# Patient Record
Sex: MALE | Race: BLACK OR AFRICAN AMERICAN | URBAN - METROPOLITAN AREA
[De-identification: names, ages, dates, MRNs, and addresses within clinical notes are randomized per-mention and may not be internally consistent; named-entity substitution may affect disease eponyms.]

---
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
## 2021-02-23 NOTE — PROGRESS NOTES
"Male New Patient Note       Assessment & Plan   Patient is a very pleasant 21-year-old male who presents today to establish care.  Patient was previously seen at a facility in [place].  A release of information was completed today to allow us to access his records.  Patient was hospitalized within the past year for hallucinations and schizophrenia-like behaviors.  He has since been on olanzapine 20 mg at bedtime.    Patient also has a history of GERD.  He has previously been taking gastrically reflux medications.  He is given a prescription for famotidine today to have on hand if he were to develop any heartburn symptoms.    Patient is otherwise a very healthy 21-year-old male.    Gastroesophageal reflux disease without esophagitis  - famotidine (PEPCID) 20 MG tablet  Dispense: 90 tablet; Refill: 3    Hallucinations  Patient is given a referral to psychiatry for ongoing management of his mental health disorders.  It is unclear exactly which diagnosis he was given to be prescribed olanzapine to begin with.  Patient is not aware of what diagnosis he holds.  We are awaiting release of information records from his hospitalization and prior care to better understand his medical history.  In the meantime, as referrals are taking quite some time to get in, a referral was placed for psychiatry here.  In addition, refill of olanzapine is sent to the pharmacy here.  - OLANZapine (ZYPREXA) 20 MG tablet  Dispense: 90 tablet; Refill: 3  - MENTAL HEALTH REFERRAL  - Adult; Psychiatry; Psychiatry; [facility] [phone]; We will contact you to schedule the appointment or please call with any questions     BMI:   Estimated body mass index is 29.97 kg/m  as calculated from the following:    Height as of this encounter: 1.918 m (6' 3.5\").    Weight as of this encounter: 110.2 kg (243 lb).   Weight management plan: Discussed briefly today.  Patient is displeased with the weight gain that he has had, which she " "has had since starting olanzapine.  He continues to be as active as possible, going to the gym.    No follow-ups on file.    Meredith Gale MD  [facility] JON Ribeiro is a 21 year old who presents for the following health issues     HPI   No cough, gastric reflux history. Some central chest aching, improves with antacids.  Patient presents to [facility] care.  Has a history of GERD, also a mental health history for which he is prescribed olanzapine.  His exact diagnosis is unknown to him.  We are awaiting release of information.    Review of Systems   Review of Systems:  CONSTITUTIONAL: Positive for weight gain since starting olanzapine. NEGATIVE for fever, chills  INTEGUMENTARY/SKIN: NEGATIVE for worrisome rashes, moles or lesions  EYES: NEGATIVE for vision changes or irritation  ENT/MOUTH: NEGATIVE for ear, mouth and throat problems  RESP: NEGATIVE for significant cough or SOB  BREAST: NEGATIVE for masses, tenderness or discharge  CV: NEGATIVE for chest pain, palpitations or peripheral edema  GI: Positive for heartburn/chest discomfort related to GERD. NEGATIVE for nausea, abdominal pain, or change in bowel habits  : NEGATIVE for frequency, dysuria, or hematuria  MUSCULOSKELETAL: NEGATIVE for significant arthralgias or myalgia  NEURO: NEGATIVE for weakness, dizziness or paresthesias  ENDOCRINE: NEGATIVE for temperature intolerance, skin/hair changes  HEME/ALLERGY: NEGATIVE for bleeding problems  PSYCHIATRIC: NEGATIVE for changes in mood or affect  Sleep:   Do you snore most or the night (as reported by a family member)? No  Do you feel sleepy or extremely tired during most of the day? No      Objective    /79   Pulse 70   Temp 98  F (36.7  C) (Oral)   Resp 16   Ht 1.918 m (6' 3.5\")   Wt 110.2 kg (243 lb)   SpO2 99%   BMI 29.97 kg/m    Body mass index is 29.97 kg/m .  Physical Exam   Vitals were reviewed and were normal  GENERAL: healthy, alert and no " distress  EYES: Eyes grossly normal to inspection, extraocular movements - intact  HENT: ears normal to inspection.   NECK: no asymmetry  RESP: lungs clear to auscultation - no rales, no rhonchi, no wheezes  CV: regular rates and rhythm, normal S1 S2, no S3 or S4 and no murmur, no click or rub -  ABDOMEN: soft, no tenderness, no  hepatosplenomegaly, no masses, normal bowel sounds  MS: extremities- no gross deformities noted, no edema  SKIN: no suspicious lesions, no rashes  NEURO: strength and tone- normal, sensory exam- grossly normal, mentation- intact, speech- normal, reflexes- symmetric  PSYCH: Alert and oriented times 3; speech- coherent , normal rate and volume; able to articulate logical thoughts, affect- flat            Marital Status:Single  Who lives in your household? Mother, brothers    Has anyone hurt you physically, for example by pushing, hitting, slapping or kicking you or forcing you to have sex? Denies  Do you feel threatened or controlled by a partner, ex-partner or anyone in your life? Denies    Sexual Health     Sexual concerns: No   STI History: Neg

## 2021-02-24 ENCOUNTER — OFFICE VISIT (OUTPATIENT)
Dept: FAMILY MEDICINE | Facility: CLINIC | Age: 21
End: 2021-02-24
Payer: COMMERCIAL

## 2021-02-24 VITALS
WEIGHT: 243 LBS | HEART RATE: 70 BPM | HEIGHT: 76 IN | SYSTOLIC BLOOD PRESSURE: 135 MMHG | OXYGEN SATURATION: 99 % | DIASTOLIC BLOOD PRESSURE: 79 MMHG | TEMPERATURE: 98 F | BODY MASS INDEX: 29.59 KG/M2 | RESPIRATION RATE: 16 BRPM

## 2021-02-24 DIAGNOSIS — R44.3 HALLUCINATIONS: ICD-10-CM

## 2021-02-24 DIAGNOSIS — K21.9 GASTROESOPHAGEAL REFLUX DISEASE WITHOUT ESOPHAGITIS: Primary | ICD-10-CM

## 2021-02-24 PROCEDURE — 99204 OFFICE O/P NEW MOD 45 MIN: CPT | Mod: GE | Performed by: STUDENT IN AN ORGANIZED HEALTH CARE EDUCATION/TRAINING PROGRAM

## 2021-02-24 RX ORDER — OLANZAPINE 15 MG/1
15 TABLET ORAL AT BEDTIME
COMMUNITY
End: 2023-10-31

## 2021-02-24 RX ORDER — OLANZAPINE 20 MG/1
20 TABLET ORAL AT BEDTIME
Qty: 90 TABLET | Refills: 3 | Status: SHIPPED | OUTPATIENT
Start: 2021-02-24 | End: 2022-04-13

## 2021-02-24 RX ORDER — FAMOTIDINE 20 MG/1
20 TABLET, FILM COATED ORAL 2 TIMES DAILY PRN
Qty: 90 TABLET | Refills: 3 | Status: SHIPPED | OUTPATIENT
Start: 2021-02-24 | End: 2023-10-31

## 2021-02-24 ASSESSMENT — MIFFLIN-ST. JEOR: SCORE: 2205.8

## 2021-02-24 NOTE — PROGRESS NOTES
Preceptor Attestation:   I discussed the patient with the resident. I have verified the content of the note, which accurately reflects my assessment of the patient and the plan of care.   Supervising Physician:  Ping Reyna MD.

## 2021-02-24 NOTE — PATIENT INSTRUCTIONS
Patient Education   Here is the plan from today's visit    1. Gastroesophageal reflux disease without esophagitis  - famotidine (PEPCID) 20 MG tablet; Take 1 tablet (20 mg) by mouth 2 times daily as needed (heartburn)  Dispense: 90 tablet; Refill: 3    2. Hallucinations  Refill today. Let's have you see a specialist about this, and we will get more information from your previous healthcare system.   - OLANZapine (ZYPREXA) 20 MG tablet; Take 1 tablet (20 mg) by mouth At Bedtime  Dispense: 90 tablet; Refill: 3  - MENTAL HEALTH REFERRAL  - Adult; Psychiatry; Psychiatry; [facility] - Psychiatry [facility] [phone]; We will contact you to schedule the appointment or please call with any questions      Please call or return to clinic if your symptoms don't go away.    Follow up plan  Please make a clinic appointment for follow up with me (VICKEY GAINES) in 3 months to ensure medication is going well.    Thank you for coming to [facility] today.  Lab Testing:  **If you had lab testing today and your results are reassuring or normal they will be mailed to you or sent through [facility] within 7 days.   **If the lab tests need quick action we will call you with the results.  The phone number we will call with results is # [phone] (home) . If this is not the best number please call our clinic and change the number.  Medication Refills:  If you need any refills please call your pharmacy and they will contact us.   If you need to  your refill at a new pharmacy, please contact the new pharmacy directly. The new pharmacy will help you get your medications transferred faster.   Scheduling:  If you have any concerns about today's visit or wish to schedule another appointment please call our office during normal business hours [phone] (8-5:00 M-F)  If a referral was made to a [facility] and you don't get a call from central scheduling please call [phone].  If a Mammogram  was ordered for you at [facility] call [phone] to schedule or change your appointment.  If you had an XRay/CT/Ultrasound/MRI ordered the number is [phone] to schedule or change your radiology appointment.   Medical Concerns:  If you have urgent medical concerns please call [phone] at any time of the day.    Meredith Gale MD

## 2021-02-25 ENCOUNTER — TELEPHONE (OUTPATIENT)
Dept: PSYCHOLOGY | Facility: CLINIC | Age: 21
End: 2021-02-25
Payer: COMMERCIAL

## 2021-02-25 NOTE — TELEPHONE ENCOUNTER
[facility] should be calling patient to schedule, but can you also provide this number [phone] to the patient so he can call them to try to schedule as well.  I am not sure of the wait time for a new patient right now. We can also refer to a community agency if patient prefers.

## 2021-03-01 DIAGNOSIS — A64 SEXUALLY TRANSMITTED DISEASE: ICD-10-CM

## 2021-03-01 PROBLEM — I51.7 LEFT VENTRICULAR HYPERTROPHY: Status: ACTIVE | Noted: 2019-08-14

## 2021-03-01 PROBLEM — F23 BRIEF PSYCHOTIC DISORDER (H): Status: ACTIVE | Noted: 2020-06-01

## 2021-03-01 PROBLEM — F41.1 GAD (GENERALIZED ANXIETY DISORDER): Status: ACTIVE | Noted: 2019-05-31

## 2021-03-01 PROBLEM — F19.10 POLYSUBSTANCE ABUSE (H): Status: ACTIVE | Noted: 2019-04-30

## 2021-03-01 RX ORDER — OMEPRAZOLE 40 MG/1
40 CAPSULE, DELAYED RELEASE ORAL DAILY
COMMUNITY
Start: 2021-03-01 | End: 2023-10-31

## 2021-03-12 NOTE — TELEPHONE ENCOUNTER
Attempted to reach patient to give him Psychiatry clinic contact information. Left a message along with my direct number.    Farideh Weeks  Care Coordinator

## 2021-03-15 NOTE — TELEPHONE ENCOUNTER
3/15/21 Attempted once again to reach out to patient with [facility] Psychiatry telephone number and to also let him know that we can refer to a community agency if patient prefers. I was not able to reach patient, sending letter to patient home.    Farideh Weeks  Care Coordinator

## 2022-04-05 ENCOUNTER — TELEPHONE (OUTPATIENT)
Dept: FAMILY MEDICINE | Facility: CLINIC | Age: 22
End: 2022-04-05
Payer: COMMERCIAL

## 2022-04-05 NOTE — TELEPHONE ENCOUNTER
Called and spoke with patient's mother regarding need for consent from patient to communicate about care with patient's mother.     Patient's mother states she scheduled an appointment for patient 4/13/2022 and would like to complete the consent at that time and collect completed forms.

## 2022-04-05 NOTE — TELEPHONE ENCOUNTER
----- Message from Linda Hope, RN sent at 2022  4:50 PM CDT -----  Regarding: RE: Forms Question    ----- Message -----  From: Nguyen Green MD  Sent: 2022   7:16 AM CDT  To: [facility] Triage Nurse  Subject: FW: Forms Question                               HelAna M ramjosef to give this to you but I won't be in for another week. We don't have official KAYLA for patient's mother to fill out any forms that involve him. He and she will need to sign ROIs giving us permission to talk to her about his medical situation and then I can fill out the forms? Until then I can't even confirm legally that he's a patient with us.     Nguyen Green MD  ----- Message -----  From: Marcus Warren  Sent: 3/31/2022   9:18 AM CDT  To: Nguyen Green MD  Subject: Forms Question                                   Patients Mom (Aury Shah  1982) called regarding a status update to forms she left. Patient was informed of normal turnaround time. Thank you!

## 2022-04-05 NOTE — TELEPHONE ENCOUNTER
Patients mom had left a voicemail with the call back number [phone] she was asking about the forms for her son and stated she needs it, its urgent and that some sort of cash assistance to help her with her rent is requesting it. Aury would like a call back.

## 2022-04-13 ENCOUNTER — OFFICE VISIT (OUTPATIENT)
Dept: FAMILY MEDICINE | Facility: CLINIC | Age: 22
End: 2022-04-13
Payer: COMMERCIAL

## 2022-04-13 VITALS
HEIGHT: 76 IN | DIASTOLIC BLOOD PRESSURE: 79 MMHG | SYSTOLIC BLOOD PRESSURE: 121 MMHG | WEIGHT: 282.4 LBS | BODY MASS INDEX: 34.39 KG/M2 | TEMPERATURE: 98.4 F | OXYGEN SATURATION: 100 % | RESPIRATION RATE: 16 BRPM | HEART RATE: 76 BPM

## 2022-04-13 DIAGNOSIS — F99 PSYCHIATRIC DISORDER: Primary | ICD-10-CM

## 2022-04-13 DIAGNOSIS — Z51.81 MEDICATION MONITORING ENCOUNTER: ICD-10-CM

## 2022-04-13 PROCEDURE — 99214 OFFICE O/P EST MOD 30 MIN: CPT | Mod: GC | Performed by: STUDENT IN AN ORGANIZED HEALTH CARE EDUCATION/TRAINING PROGRAM

## 2022-04-13 RX ORDER — OLANZAPINE 20 MG/1
20 TABLET ORAL AT BEDTIME
Qty: 90 TABLET | Refills: 3 | Status: SHIPPED | OUTPATIENT
Start: 2022-04-13 | End: 2023-08-04

## 2022-04-13 NOTE — PROGRESS NOTES
Preceptor Attestation:    I discussed the patient with the resident and evaluated the patient in person.Dr Garcia, PhD also involved with face-to-face. I have verified the content of the note, which accurately reflects my assessment of the patient and the plan of care.   Supervising Physician:  Jon Alonso MD.

## 2022-04-13 NOTE — PROGRESS NOTES
"  Assessment & Plan     Psychiatric disorder  Medication Monitoring  Based on the encounter and history given, I have concerns for Quintin having developed/developing a thought disorder (namely, concern for schizophrenia) considering what seems to be an psychiatric admission history of psychosis in the setting to substance use, a young male in his 20s, and now persistent specific psychiatric symptoms that have been progressing past 6 months that are responsive to olanzapine.     [facility] during our visit, and I spoke with them to get the exact name and fax number for me to request an KAYLA for this hosptialization. I will also get Quintin into a Monday PM slot with our visiting psychiatrist Dr. Alvarado, requesting assistance and suggestions for deeper evaluation of Quintin's condition. Together, this information will help us in filing for assistance through [facility].     I will also see Quintin regularly as we navigate his care so that he is not lost to follow up. In the meantime, I will refill his olanzapine 20mg qhs. I will obtain blood-work for monitoring metabolic side effects of anti-psychotic use (CMP / Lipids / TSH) as we do not have these on file recently.          BMI:   Estimated body mass index is 34.57 kg/m  as calculated from the following:    Height as of this encounter: 1.925 m (6' 3.79\").    Weight as of this encounter: 128.1 kg (282 lb 6.4 oz).      I want to see Quintin and his mother again in 3 weeks so we can collect the information gathered to help him connect with services.     Sampson Chahal DO  PGY1 Family Medicine Resident   [facility]    Department of Family Medicine and Community Health       Subjective   Quintin is a 22 year old who presents for mental health concern.     HPI   Mental Health Concerns  This is my first time meeting the patient and his mother.    Quintin and his mother present for a filling of " "KAYLA form for his mother to have access to his medical records in relation to his mother wanting to apply to the [facility] for assistance programs in relation to her concern for her son's mental health.    Quintin and his mother moved to [place] from [place] last year and established care with us on Feb 2021. At this care visit, it was noted Quintin was on Olanzapine 20mg at bedtime - but neither he nor his mother knew exactly why. However, his mother stated he was admitted for psychiatric hospitalization back in [place]. Initially we tried to connect the family to psychiatric eval after their visit in Feb 2021, but it does not seem this was followed up on. As well, we obtained an KAYLA for medical care back in [place] - but what we received were outpatient primary clinic records. However, these records did list a diagnosis code of polysubstance use (xanax, heroin, ectasy, methamphetamine) - there was no context around this code, however.    On further clarification today - his mother states that was admitted to [facility] after taking some substance from a girl he had an attraction to - after-which he began to have manic like behaviors, reported that her son had jumped out of a 3rd story window, was half-naked in the streets attempted to get into cars, and was found by police in a graveyard. She states that when she went to visit her son in the hospital, he did not recognize who she was. She has been since trying to get records from this hospital specifically to give to us.    I reviewed what Quintin is currently experience both on and off olanzapine. Asking Quintin, he states that his medication helps him \"get back to my sense\", \"I can sleep smoother\" - and that off medication he feels \"more active, I am up for hours - like sometimes all days, most days\", and that \"my body feels like it wants me to go to sleep but I can't\".    Asking his mother, she has noted that when does not take this medication he appears \"stuck\" - " "stating he will stand staring at the wall for prolonged periods, not interacting with those who try to interact with him; that he laughs and talks to himself in bursts and then quiets down, again \"appearing stuck\". She also worries for his safety (for example, he will leave the oven on and burn foods). However, when he takes his olanzapine, he acts more \"like my son\" and is interactable / engagable - but also that he does not remember these events that occur off medication. His mother also states that prior to all this Quintin was functioning well socially - went to school, held a job.     Other Psychiatric History  FHx - mother reports his father was an alcoholic  MedHx - no psychiatric medication was named in the KAYLA we had obtained, mother unaware of medications prior to olanzapine  PsychHx - no hospitalizations or admissions prior to [facility]      Review of Systems   Constitutional, HEENT, cardiovascular, pulmonary, gi and gu systems are negative, except as otherwise noted.      Objective    /79   Pulse 76   Temp 98.4  F (36.9  C) (Oral)   Resp 16   Ht 1.925 m (6' 3.79\")   Wt 128.1 kg (282 lb 6.4 oz)   SpO2 100%   BMI 34.57 kg/m    Body mass index is 34.57 kg/m .  Physical Exam   Appearance: Appropriately dressed. Well groomed. Wearing mask.   Behavior: Does not maintain eye contact w/ examiner. Often found starting /down/at phone. He is calm and cooperative and will answer examiner's questions. No fidgeting or restless behavior observed.   Speech: Somewhat slow, very monotone w/ little emotive cheo.   Mood: \"I'm ok\"  Emotive: Blunted/restricted affect as demonstrated by speech pattern.   Psychomotor: Normal  Thought Process: Does make logical conclusions (ie medication makes him feel his senses)  Thought Content: Does not seem to be responding to internal stimuli on exam today. However, his answers to questions offer little elaboration and are a bit vague. He does not present delusional thinking " "during this encounter. Denies AVH.   Insight: Seems somewhat limited - understands that medication helps, but not neccasirly for what it is being used for.  Judgement: Hard to ascertain as this is my first visit, but based on mother's report at times judgement seems impaired (ie letting the stove stay on and burning foods, or repeatedly doing laundry over and over).   Cognition: AAOx3. Abstract thinking is diminished (ie cannot consistently demonstrate abstract thinking - can elaborate \"early bid catches the worm\", cannot elaborate \"those who own glass houses should not throw stones\", defines concrete similarities between train and bicycles \"wheels, gears\")    ----- Service Performed and Documented by Resident or Fellow ------        "

## 2022-04-13 NOTE — Clinical Note
Dalia Alvarado,  My name is Sampson, I am a Family Medicine intern at the [place]'s program. This is a patient that I have just met and will be taking over for medical care.  He is on your schedule for our consult clinic this coming Monday. In brief - he and his mother moved here from [place] a year or so ago, and came to us initially on Olanzapine. Neither he nor his mother knew what for, exactly. His mother returned to have us help them apply for county assistance labelled him under [facility].  From what I can gather based on the info we have - I have concern for thought disorder, namely schizophrenia/schizophrenia like behavior. He had a psychiatric admission in [place] in the setting of substance use that we are now obtaining records from, but now has lingering affect symptoms which are responding to olanzapine.   We are asking for assistance in clarifying underlying pathology, and any suggestions on further diagnostic evals that would be appropriate.   Thank you for your help.

## 2022-04-13 NOTE — Clinical Note
"The mental health csae c/f thought disorder. I billed as level 4 as this is in a sense a \"new undiagnosed\" problem (we are trying to name exactly what his condition is) w/ medication monitoring (I ordered labwork for them to complete when they come next week to monitor antipsychotic use). " healing

## 2022-04-18 ENCOUNTER — OFFICE VISIT (OUTPATIENT)
Dept: PSYCHOLOGY | Facility: CLINIC | Age: 22
End: 2022-04-18
Payer: COMMERCIAL

## 2022-04-18 ENCOUNTER — LAB (OUTPATIENT)
Dept: LAB | Facility: CLINIC | Age: 22
End: 2022-04-18
Payer: COMMERCIAL

## 2022-04-18 DIAGNOSIS — Z51.81 MEDICATION MONITORING ENCOUNTER: ICD-10-CM

## 2022-04-18 DIAGNOSIS — F29 PSYCHOSIS, UNSPECIFIED PSYCHOSIS TYPE (H): Primary | ICD-10-CM

## 2022-04-18 LAB
ALBUMIN SERPL-MCNC: 3.7 G/DL (ref 3.4–5)
ALP SERPL-CCNC: 88 U/L (ref 40–150)
ALT SERPL W P-5'-P-CCNC: 39 U/L (ref 0–70)
ANION GAP SERPL CALCULATED.3IONS-SCNC: 9 MMOL/L (ref 3–14)
AST SERPL W P-5'-P-CCNC: 22 U/L (ref 0–45)
BILIRUB SERPL-MCNC: 0.4 MG/DL (ref 0.2–1.3)
BUN SERPL-MCNC: 9 MG/DL (ref 7–30)
CALCIUM SERPL-MCNC: 9.4 MG/DL (ref 8.5–10.1)
CHLORIDE BLD-SCNC: 104 MMOL/L (ref 94–109)
CHOLEST SERPL-MCNC: 221 MG/DL
CO2 SERPL-SCNC: 26 MMOL/L (ref 20–32)
CREAT SERPL-MCNC: 0.89 MG/DL (ref 0.66–1.25)
FASTING STATUS PATIENT QL REPORTED: ABNORMAL
GFR SERPL CREATININE-BSD FRML MDRD: >90 ML/MIN/1.73M2
GLUCOSE BLD-MCNC: 96 MG/DL (ref 70–99)
HDLC SERPL-MCNC: 40 MG/DL
LDLC SERPL CALC-MCNC: 157 MG/DL
NONHDLC SERPL-MCNC: 181 MG/DL
POTASSIUM BLD-SCNC: 3.9 MMOL/L (ref 3.4–5.3)
PROT SERPL-MCNC: 8.4 G/DL (ref 6.8–8.8)
SODIUM SERPL-SCNC: 139 MMOL/L (ref 133–144)
TRIGL SERPL-MCNC: 120 MG/DL
TSH SERPL DL<=0.005 MIU/L-ACNC: 0.81 MU/L (ref 0.4–4)

## 2022-04-18 PROCEDURE — 80061 LIPID PANEL: CPT

## 2022-04-18 PROCEDURE — 84443 ASSAY THYROID STIM HORMONE: CPT

## 2022-04-18 PROCEDURE — 36415 COLL VENOUS BLD VENIPUNCTURE: CPT

## 2022-04-18 PROCEDURE — 99205 OFFICE O/P NEW HI 60 MIN: CPT | Performed by: PSYCHIATRY & NEUROLOGY

## 2022-04-18 PROCEDURE — 80053 COMPREHEN METABOLIC PANEL: CPT

## 2022-04-18 NOTE — Clinical Note
Hi Dr. Chahal,  Please see our note for recommendations; referral has been placed to first episode psychosis programs within the [facility].  Also, mom asked again today about the form she needs signed from you indicating that Quintin does have a psychiatric disorder -- I told her I would pass along the message.  As always, let us know if any questions.  Thanks! Phylicia Alvarado MD Child & Adolescent Psychiatry
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
## 2022-04-18 NOTE — PATIENT INSTRUCTIONS
Plan Today    1) Medications:  Continue olanzapine 20 mg by mouth daily at this time.      2) Follow-up:  We will place a referral to [email or web address]-navigate-program.     3) Other:  Please continue to follow-up with your PCP for ongoing care.        Thank you for coming to the [facility].    Scheduling:  If you have any concerns about today's visit or wish to schedule another appointment please call our office during normal business hours [phone] (8-5:00 M-F)        MENTAL HEALTH CRISIS NUMBERS:  For a medical emergency please call  911 or go to the nearest ER.     [facility]:   [facility] -[phone]   Crisis Residence [facility] -[phone]   Walk-In Counseling [facility] -[phone]   COPE 24/7 [place] Mobile Team -[phone] (adults)/[phone] (child)  [facility] - [phone]      [facility]:   [facility] - [phone]   Walk-in counseling [facility] - [phone]   Walk-in counseling [facility] - [phone]   Crisis Residence [facility] - [phone]  Urgent Care Adult Mental [facility]-[phone] mobile unit/ 24/7 crisis line    National Crisis Numbers:   National Suicide Prevention Lifeline: 7-959-575-TALK (330-929-6852)  Poison Control Center - 5-745-855-8059  [facility]Mail[employer]/resources for a list of additional resources (SOS)  Trans Lifeline a hotline for transgender people 1-510.945.8376  [employer] a hotline for LGBT youth 1-563.409.9589  Crisis Text Line: For any crisis 24/7   To: 565675  see www.crisistextline.org  - IF MAKING A CALL FEELS TOO HARD, send a text!       Again thank you for choosing [facility] and please let us know how we can best partner with you to improve you and your family's health.

## 2022-09-22 ENCOUNTER — TELEPHONE (OUTPATIENT)
Dept: FAMILY MEDICINE | Facility: CLINIC | Age: 22
End: 2022-09-22

## 2022-09-22 NOTE — TELEPHONE ENCOUNTER
[facility] phone call message- patient requesting a refill:    Full Medication Name: OLANZapine (ZYPREXA) 20 MG tablet    Dose: Take 1 tablet (20 mg) by mouth At Bedtime     Pharmacy confirmed as   [facility] [place], [place] - [address]  [facility] [id number]  Phone: [phone] Fax: [phone]  : Yes    Additional Comments: Patient is scheduled for a med f/u on 10/12 but will need a refill of medication beforehand.     OK to leave a message on voice mail? Yes    Primary language: English      needed? No    Call taken on September 22, 2022 at 1:39 PM by Yu Hale

## 2022-10-13 ENCOUNTER — TELEPHONE (OUTPATIENT)
Dept: FAMILY MEDICINE | Facility: CLINIC | Age: 22
End: 2022-10-13
Payer: COMMERCIAL

## 2022-10-13 DIAGNOSIS — T43.505D ANTIPSYCHOTICS, NEUROLEPTICS, AND MAJOR TRANQUILIZERS CAUSING ADVERSE EFFECT IN THERAPEUTIC USE, SUBSEQUENT ENCOUNTER: ICD-10-CM

## 2022-10-13 DIAGNOSIS — F25.0 SCHIZOAFFECTIVE DISORDER, BIPOLAR TYPE (H): Primary | ICD-10-CM

## 2022-10-13 DIAGNOSIS — R63.5 WEIGHT GAIN: ICD-10-CM

## 2022-10-13 PROCEDURE — 99207 E-CONSULT TO BEHAVIORAL HEALTH (ADULT OUTPT PROVIDER TO SPECIALIST WRITTEN QUESTION & RESPONSE): CPT | Performed by: STUDENT IN AN ORGANIZED HEALTH CARE EDUCATION/TRAINING PROGRAM

## 2022-10-13 RX ORDER — METFORMIN HCL 500 MG
500 TABLET, EXTENDED RELEASE 24 HR ORAL
Qty: 90 TABLET | Refills: 1 | Status: SHIPPED | OUTPATIENT
Start: 2022-10-13 | End: 2024-02-20

## 2022-10-13 NOTE — TELEPHONE ENCOUNTER
"I spoke with Quintin's mom on the phone today, as he had missed his appointment yesterday.    Per his mom, Quintin did not want to get out of bed - stating he was very tired. Per he, she has noted he has become very fatigued \"lazy appearing\" since being on his medication consistently - wanting to mainly just stay at home, eat, and play video games.     She states her petition [facility] the [facility] to apply for assistance / benefits via serious mental illness (in our case, Schizoaffective disorder that had involuntary inpatient psychiatry admission) was denied. She wanted to bring this form to me so I can see what their denial reason was and try to argue against it.    Furthermore, I had planned to start Quintin on Metformin considering his Olanzapine use (something also recommended by our initial Psychiatrist). I briefly discussed this with his mom who is on board, she is noting increasing weight gain in Quintin since being on Olanzapine consistently.    Schizoaffective Disorder  Olanzapine use  - Start Metformin 500mg at dinner - plan to escalate dose if tolerated  - Mom will be in the county denial forms so we can look thru them to see what we can do to offer argument   - Will send a message to our behavorial health home, he would meet criteria to be involved with us and I think this is a smart option considering  - Will put in a referral to Psychiatry - am open to co-manage patient with Psychiatrist      Sampson Chahal, DO  PGY2 Family Medicine Resident   [facility]    Department of Family Medicine and Community Health         "

## 2022-10-14 ENCOUNTER — E-CONSULT (OUTPATIENT)
Dept: PSYCHIATRY | Facility: CLINIC | Age: 22
End: 2022-10-14
Payer: COMMERCIAL

## 2022-10-14 PROCEDURE — 99451 NTRPROF PH1/NTRNET/EHR 5/>: CPT | Performed by: PSYCHIATRY & NEUROLOGY

## 2022-10-14 NOTE — PROGRESS NOTES
10/14/2022     E-Consult has been accepted.    Interprofessional consultation requested by:  Gonzalo Crane DO      Clinical Question/Purpose: Referral already made for establishing co-management with Psychiatry and Family Medicine. See note 4/18/2022.    Patient assessment and information reviewed: chart review    Recommendations:  There are a few options to possibly consider to help out until seeing a full time psychiatrist.  When does he take the zyprexa?  Often it is in the morning, so a simple switch would be to bedtime, to see if this can help with the fatigue.  Another thought would be to split the dose 10 mg bid, which can sometimes assist with the fatigue.    It is very good that he is taking it consistently, and since he is, it is possible that the dose could be decreased because he is consistently on it.  I would only decrease by 5 mg (so 15 mg, could be 5 mg in the am and 10 mg at bedtime, or all at bedtime).  I would see if it helps after 2 weeks and make sure there are no increase in his symptoms.  If still fatigued, then could go down another 5 mg to 10 mg total.  Again, could split the dose or give all at night.  I would not go below 10 mg at this point.      Other thoughts would be if he is still using THC or recently stopped.  Or cigarettes?  THC can certainly cause more fatigue but interesting enough, if smoking and then stopped, then there may be more zyprexa in his system due to the smoking speeding up the metabolism of zyprexa.  This could be why he is more fatigued than before.      If needing to switch to another antipsychotic, a simple choice would be to taper off zyprexa and start abilify.  This tends to not cause as much fatigue as zyprexa and does not have as much weight gain.  You can taper like mentioned above but go down another 5 mg to get a total of 5 mg of zyprexa.  When on the 5 mg of zyprexa, you can start abilify at 2 mg.  Then after 2 weeks of both, one can stop the zyprexa  and increase abilify to 5 mg.  Continue to monitor symptoms and increase abilify by 5 mg as needed.  Max dose would be 30 mg.      Thank you for the consult.      The recommendations provided in this E-Consult are based on a review of clinical data pertinent to the clinical question presented, without a review of the patient's complete medical record or, the benefit of a comprehensive in-person or virtual patient evaluation. This consultation should not replace the clinical judgement and evaluation of the provider ordering this E-Consult. Any new clinical issues, or changes in patient status since the filing of this E-Consult will need to be taken into account when assessing these recommendations. Please contact me if you have further questions.    My total time spent reviewing clinical information and formulating assessment was 20 minutes.        Noel Jerez MD

## 2022-10-20 NOTE — TELEPHONE ENCOUNTER
[facility]  Type of Contact: Phone call (not reached/unavailable)  Hyun Walden, Social Work Care Coordinator    [facility]     SW attempted to call patient / patient's mom today via phone. No answer, therefore left a voicemail and provided direct call back number. SW to continue to reach out to make contact and provide support as needed.     Patient has follow up appt with Dr. Chahal on 11/03 SW will attempt to see patient then to discuss support needed and [facility] services / resources.     Hyun Walden, CAL  [facility]- Social Work Care Coordinator

## 2023-05-31 ENCOUNTER — TELEPHONE (OUTPATIENT)
Dept: FAMILY MEDICINE | Facility: CLINIC | Age: 23
End: 2023-05-31
Payer: COMMERCIAL

## 2023-05-31 NOTE — TELEPHONE ENCOUNTER
[facility] phone call message- patient requesting to speak with PCP or provider:    PCP: Sampson Chahal    Additional Comments: The patient's mother would like to speak with PCP about the patient's missed appts.    Is a call back needed? Yes    Patient informed that it may take up to 2 business days to hear back from PCP:Yes    OK to leave a message on voice mail? Yes       Primary language: English      needed? No    Call taken on May 31, 2023 at 12:50 PM by Sandi Russell

## 2023-06-03 NOTE — TELEPHONE ENCOUNTER
Hey team, I've tried calling the patient's mother a few times now - unable to get thru.    The patient has Schizoaffective D/O - and that is usually behind the no-shows (doesn't want to leave the house, etc).     This patient may benefit from a home visit? Maybe? If we can check in and see how things are going?    - Shimon

## 2023-06-07 NOTE — TELEPHONE ENCOUNTER
6/7/23 Care Coordinator has attempted to reach out to Mother, no answer. Will try again.    Farideh Weeks  Care Coordinator  [facility]  [phone]

## 2023-06-09 NOTE — TELEPHONE ENCOUNTER
6/9/23 Care Coordinator attempted again to reach out to patient's Mother (Aury Aly) CC was able to leave a message with this call, along with direct number. Will wait for return call.      Farideh Weeks  Care Coordinator  [facility]  [phone]

## 2023-08-04 ENCOUNTER — OFFICE VISIT (OUTPATIENT)
Dept: FAMILY MEDICINE | Facility: CLINIC | Age: 23
End: 2023-08-04
Payer: COMMERCIAL

## 2023-08-04 VITALS
TEMPERATURE: 98.4 F | BODY MASS INDEX: 26.92 KG/M2 | DIASTOLIC BLOOD PRESSURE: 84 MMHG | WEIGHT: 216.5 LBS | OXYGEN SATURATION: 100 % | SYSTOLIC BLOOD PRESSURE: 139 MMHG | HEART RATE: 81 BPM | RESPIRATION RATE: 18 BRPM | HEIGHT: 75 IN

## 2023-08-04 DIAGNOSIS — F99 PSYCHIATRIC DISORDER: ICD-10-CM

## 2023-08-04 DIAGNOSIS — F25.0 SCHIZOAFFECTIVE DISORDER, BIPOLAR TYPE (H): Primary | ICD-10-CM

## 2023-08-04 LAB
ALBUMIN SERPL BCG-MCNC: 4.2 G/DL (ref 3.5–5.2)
ALP SERPL-CCNC: 68 U/L (ref 40–129)
ALT SERPL W P-5'-P-CCNC: 9 U/L (ref 0–70)
ANION GAP SERPL CALCULATED.3IONS-SCNC: 9 MMOL/L (ref 7–15)
AST SERPL W P-5'-P-CCNC: 20 U/L (ref 0–45)
BILIRUB SERPL-MCNC: 0.3 MG/DL
BUN SERPL-MCNC: 4.6 MG/DL (ref 6–20)
CALCIUM SERPL-MCNC: 9.5 MG/DL (ref 8.6–10)
CHLORIDE SERPL-SCNC: 105 MMOL/L (ref 98–107)
CHOLEST SERPL-MCNC: 150 MG/DL
CREAT SERPL-MCNC: 0.98 MG/DL (ref 0.67–1.17)
DEPRECATED HCO3 PLAS-SCNC: 25 MMOL/L (ref 22–29)
ERYTHROCYTE [DISTWIDTH] IN BLOOD BY AUTOMATED COUNT: 13.3 % (ref 10–15)
GFR SERPL CREATININE-BSD FRML MDRD: >90 ML/MIN/1.73M2
GLUCOSE SERPL-MCNC: 95 MG/DL (ref 70–99)
HCT VFR BLD AUTO: 46.4 % (ref 40–53)
HDLC SERPL-MCNC: 38 MG/DL
HGB BLD-MCNC: 15.3 G/DL (ref 13.3–17.7)
HOLD SPECIMEN: NORMAL
LDLC SERPL CALC-MCNC: 97 MG/DL
MCH RBC QN AUTO: 30 PG (ref 26.5–33)
MCHC RBC AUTO-ENTMCNC: 33 G/DL (ref 31.5–36.5)
MCV RBC AUTO: 91 FL (ref 78–100)
NONHDLC SERPL-MCNC: 112 MG/DL
PLATELET # BLD AUTO: 297 10E3/UL (ref 150–450)
POTASSIUM SERPL-SCNC: 4.4 MMOL/L (ref 3.4–5.3)
PROT SERPL-MCNC: 7.2 G/DL (ref 6.4–8.3)
RBC # BLD AUTO: 5.1 10E6/UL (ref 4.4–5.9)
SODIUM SERPL-SCNC: 139 MMOL/L (ref 136–145)
TRIGL SERPL-MCNC: 73 MG/DL
TSH SERPL DL<=0.005 MIU/L-ACNC: 1.27 UIU/ML (ref 0.3–4.2)
WBC # BLD AUTO: 7 10E3/UL (ref 4–11)

## 2023-08-04 PROCEDURE — 99214 OFFICE O/P EST MOD 30 MIN: CPT | Mod: GC | Performed by: STUDENT IN AN ORGANIZED HEALTH CARE EDUCATION/TRAINING PROGRAM

## 2023-08-04 PROCEDURE — 85027 COMPLETE CBC AUTOMATED: CPT | Performed by: STUDENT IN AN ORGANIZED HEALTH CARE EDUCATION/TRAINING PROGRAM

## 2023-08-04 PROCEDURE — 36415 COLL VENOUS BLD VENIPUNCTURE: CPT | Performed by: STUDENT IN AN ORGANIZED HEALTH CARE EDUCATION/TRAINING PROGRAM

## 2023-08-04 PROCEDURE — 80061 LIPID PANEL: CPT | Performed by: STUDENT IN AN ORGANIZED HEALTH CARE EDUCATION/TRAINING PROGRAM

## 2023-08-04 PROCEDURE — 84443 ASSAY THYROID STIM HORMONE: CPT | Performed by: STUDENT IN AN ORGANIZED HEALTH CARE EDUCATION/TRAINING PROGRAM

## 2023-08-04 PROCEDURE — 80053 COMPREHEN METABOLIC PANEL: CPT | Performed by: STUDENT IN AN ORGANIZED HEALTH CARE EDUCATION/TRAINING PROGRAM

## 2023-08-04 RX ORDER — OLANZAPINE 10 MG/1
20 TABLET ORAL 2 TIMES DAILY
Qty: 360 TABLET | Refills: 3 | Status: SHIPPED | OUTPATIENT
Start: 2023-08-04 | End: 2023-10-31

## 2023-08-04 NOTE — PROGRESS NOTES
"  Assessment & Plan     Schizoaffective disorder, bipolar type (H)  First follow up in sometime - mom admits that it is difficult to get him to come to appts. From my perspective, we need to get monitoring labwork for his Olanzapine. We can also trial splitting his 20mg at bedtime to 10mg BID to see if this can help with fatigue.    However, more importantly, I wanted his mom to meet Danny, our [facility] SW, who can better assist with navigating the  his mom is trying to connect w/. Will also re-refer into psychiatry as they wanted to take over long term management per their initial consult note.      - OLANZapine (ZYPREXA) 10 MG tablet; Take 2 tablets (20 mg) by mouth 2 times daily  - CBC with Platelets and Reflex to Iron Studies; Future  - Comprehensive metabolic panel; Future  - Lipid panel; Future  - TSH with free T4 reflex; Future  - CBC with Platelets and Reflex to Iron Studies  - Comprehensive metabolic panel  - Lipid panel  - TSH with free T4 reflex  - Adult Mental Health  Referral; Future         BMI:   Estimated body mass index is 27.06 kg/m  as calculated from the following:    Height as of this encounter: 1.905 m (6' 3\").    Weight as of this encounter: 98.2 kg (216 lb 8 oz).       Return in about 1 month (around 9/4/2023).    DO JONATHAN Garrison [facility] JON iRbeiro is a 23 year old, presenting for the following health issues:  Forms      HPI     Schizoaffective D/O  Here with mom  Have not seen him in sometime - per mom it's hard to get him to come to appts  Currently on 20mg Olanzapine at bedtime - still appears to be fatigued with this  Is smoking THC intermittently - per mom he gets it from friends he knows  Per mom - when he smokes he seems more \"active\" - specifically he will attend to his ADLs (cooking, laundry, be more social, etc etc) - without it he seems more withdrawn (in room, playing video games)  Unemployed right now - wants to look for " "work  He and mom deny any symptoms of emotional outbursts (deny overt psychosis, deny hallucinations, deny manic behaviors - referencing what he experienced in [place])  Per mom - has been trying to get services from the [facility] but this has been difficult       Review of Systems   Constitutional, HEENT, cardiovascular, pulmonary, gi and gu systems are negative, except as otherwise noted.      Objective    /84   Pulse 81   Temp 98.4  F (36.9  C)   Resp 18   Ht 1.905 m (6' 3\")   Wt 98.2 kg (216 lb 8 oz)   SpO2 100%   BMI 27.06 kg/m    Body mass index is 27.06 kg/m .  Physical Exam   GENERAL: healthy, alert and no distress  MS: no gross musculoskeletal defects noted, no edema  NEURO: Normal strength and tone, mentation intact and speech normal  PSYCH:   Appearance: Appropriate, groomed  Behavior: Attentive, cooperative, still appearing but not stiff   Speech: Normal but monotone  Mood: \"im ok:  Emotive: Blunt  Psychomotor: Normal to somewhat slow but not stiff   Thought Process: Appears logical -> able to tell me what his medications do for him when asked  Thought Content: Does not appear to readily be responding to internal stimuli, denies SI and HI  Insight: Appropriate at this time   Judgement: Appropriate at this time   Cognition:  AAOx3            "

## 2023-08-04 NOTE — PROGRESS NOTES
Preceptor Attestation:   Patient seen, evaluated and discussed with the resident. I have verified the content of the note, which accurately reflects my assessment of the patient and the plan of care.   Supervising Physician:  Aashish Lechuga MD

## 2023-08-29 ENCOUNTER — DOCUMENTATION ONLY (OUTPATIENT)
Dept: FAMILY MEDICINE | Facility: CLINIC | Age: 23
End: 2023-08-29
Payer: COMMERCIAL

## 2023-08-29 NOTE — PROGRESS NOTES
"When opening a documentation only encounter, be sure to enter in \"Chief Complaint\" Forms and in \" Comments\" Title of form, description if needed.    Quintin is a 23 year old  male  Form received via: Patient Drop Off  Form now resides in: Provider Lisa HUSTON                "

## 2023-09-15 ENCOUNTER — TELEPHONE (OUTPATIENT)
Dept: FAMILY MEDICINE | Facility: CLINIC | Age: 23
End: 2023-09-15
Payer: COMMERCIAL

## 2023-09-15 NOTE — CONFIDENTIAL NOTE
9/15/23    Care Coordinator attempted to contact patient/mother to schedule a home visit with Dr. Chahal and CATHERINE. Phone number rang twice and then went to busy. CC tried two times, but could not leave a voicemail. CC will attempt again next week.      Sarina Caraballo  Care Coordinator  [phone]

## 2023-09-27 ENCOUNTER — TELEPHONE (OUTPATIENT)
Dept: FAMILY MEDICINE | Facility: CLINIC | Age: 23
End: 2023-09-27
Payer: COMMERCIAL

## 2023-09-27 NOTE — TELEPHONE ENCOUNTER
9/27/23    Care Coordinator's 2nd attempt to contact family to inquire about a home visit with patient's PCP, Dr. Chahal. Home phone number was not available and no answer on mobile phone. CC had to leave a message on mobile phone and gave direct call back number to call and schedule.      Sarina Caraballo  Care Coordinator  [phone]

## 2023-09-29 NOTE — CONFIDENTIAL NOTE
Care Coordinator received a call back message on desk phone from patient's mom, Aury, on 9/28 at 11:33 am.  Aury said she would like to schedule a home visit. CC called mother back and got a home visit appointment scheduled via phone call on 10/31 at 1 pm. CC confirmed the home address matches the address provider/ CATHERINE will go to. Will route to CATHERINE Iverson and Dr. Chahal.      Sarina Caraballo  Care Coordinator

## 2023-10-02 ENCOUNTER — TELEPHONE (OUTPATIENT)
Dept: FAMILY MEDICINE | Facility: CLINIC | Age: 23
End: 2023-10-02

## 2023-10-02 NOTE — TELEPHONE ENCOUNTER
[facility] Medicine Clinic phone call message- patient requesting form status:    Type of Form: Medical records release form    Given to:     Submitted: within 10 business days     Date Submitted: 9/29/23     Date Needed by: ASAP    Additional Comments:     OK to leave a message on voice mail? Yes    Primary language: English      needed? No    Call taken on October 2, 2023 at 9:43 AM by Oscar Bowers

## 2023-10-04 ENCOUNTER — DOCUMENTATION ONLY (OUTPATIENT)
Dept: FAMILY MEDICINE | Facility: CLINIC | Age: 23
End: 2023-10-04
Payer: COMMERCIAL

## 2023-10-04 NOTE — PROGRESS NOTES
"When opening a documentation only encounter, be sure to enter in \"Chief Complaint\" Forms and in \" Comments\" Title of form, description if needed.    Quintin is a 23 year old  male  Form received via: Fax  Form now resides in: Provider Ready    VAMSHI HUSTON    Form has been completed by provider.     Form sent out via: Fax to [facility] at Fax Number: [phone]  Patient informed: Yes  Output date: October 10, 2023    VAMSHI HUSTON      **Please close the encounter**              "

## 2023-10-31 ENCOUNTER — OFFICE VISIT (OUTPATIENT)
Dept: FAMILY MEDICINE | Facility: CLINIC | Age: 23
End: 2023-10-31
Payer: COMMERCIAL

## 2023-10-31 DIAGNOSIS — F25.0 SCHIZOAFFECTIVE DISORDER, BIPOLAR TYPE (H): Primary | ICD-10-CM

## 2023-10-31 PROCEDURE — 99214 OFFICE O/P EST MOD 30 MIN: CPT | Mod: GC | Performed by: STUDENT IN AN ORGANIZED HEALTH CARE EDUCATION/TRAINING PROGRAM

## 2023-10-31 RX ORDER — OLANZAPINE 10 MG/1
10 TABLET ORAL 2 TIMES DAILY
Qty: 90 TABLET | Refills: 3 | Status: SHIPPED | OUTPATIENT
Start: 2023-10-31 | End: 2024-02-20

## 2023-10-31 ASSESSMENT — ENCOUNTER SYMPTOMS
SPEECH DIFFICULTY: 0
WEAKNESS: 0
NAUSEA: 0
DIZZINESS: 0
AGITATION: 0
DIAPHORESIS: 0
ABDOMINAL PAIN: 0
HYPERACTIVE: 0
PALPITATIONS: 0
CHEST TIGHTNESS: 0
DECREASED CONCENTRATION: 0
HALLUCINATIONS: 0
CONFUSION: 0
VOMITING: 0
SLEEP DISTURBANCE: 0
NERVOUS/ANXIOUS: 1
CONSTIPATION: 0
DYSPHORIC MOOD: 0
WHEEZING: 0
UNEXPECTED WEIGHT CHANGE: 0
APPETITE CHANGE: 0
DIARRHEA: 0
HEADACHES: 0
SHORTNESS OF BREATH: 0
FATIGUE: 1

## 2023-10-31 NOTE — Clinical Note
"COMPASS report submitted. I actually noticed something on the original script - was sent as 10mg tablets. I called the mom as I was writing my note to ask about this, as I thought I saw the pill bottle as 20mg tablets. It was indeed 10mg tablets, and as Quintin manages his owns meds, he only ever took 1 tab in the morning and 1 tab at night (and sometimes just 1 tab in a day). So - in actuality, it seems he was never exposed to a higher dose, but have been exposed to a lower dose. This would make sense in regards to his anxious behaviors - as mom says he really is only restless whenever he didn't take his olanzapine. So - its likely the \"restless\" or anxiety came from days when he took either no or just one 10mg tablet. "

## 2023-10-31 NOTE — PROGRESS NOTES
"       HPI       Shira Ribeiro is 23 year old who is being seen as a home-visit encounter today as patient's mother has had a hard time getting him to attend appointments in the context of his schizoaffective d/o.     Schizoaffective D/O   Per mom - pharmacy had sent them a 20mg BID dosing of the olanzapine  He seems more \"motivated\", but maybe a touch more anxious  Sleeping ok - in-fact when he falls asleep he sleeps hard  Eating well at home - mom is trying to encourage more vegetables in the diet (brother's live with them)  Per him he states he feels fine, no acute concerns - has been trying to navigate how to look for work   Mom has been having difficult w/ navigating SI to help with finances for taking care of Quintin. States has been denied 3 times w/o being given a reason.    Patient Active Problem List   Diagnosis    Brief psychotic disorder (H)    CHAD (generalized anxiety disorder)    Polysubstance abuse (H)    Left ventricular hypertrophy    Sexually transmitted disease       Current Outpatient Medications   Medication Sig Dispense Refill    OLANZapine (ZYPREXA) 10 MG tablet Take 1 tablet (10 mg) by mouth 2 times daily 90 tablet 3    metFORMIN (GLUCOPHAGE XR) 500 MG 24 hr tablet Take 1 tablet (500 mg) by mouth daily (with dinner) for 90 days 90 tablet 1        No Known Allergies    No results found for this or any previous visit (from the past 24 hour(s)).       Review of Systems:     Review of Systems   Constitutional:  Positive for fatigue. Negative for appetite change, diaphoresis and unexpected weight change.   Respiratory:  Negative for chest tightness, shortness of breath and wheezing.    Cardiovascular:  Negative for chest pain and palpitations.   Gastrointestinal:  Negative for abdominal pain, constipation, diarrhea, nausea and vomiting.   Neurological:  Negative for dizziness, syncope, speech difficulty, weakness and headaches.   Psychiatric/Behavioral:  Negative for agitation, confusion, decreased " "concentration, dysphoric mood, hallucinations, self-injury, sleep disturbance and suicidal ideas. The patient is nervous/anxious (Per mom \"a little more anxious\"). The patient is not hyperactive.           Physical Exam:     Vital signs:                         Estimated body mass index is 27.06 kg/m  as calculated from the following:    Height as of 8/4/23: 1.905 m (6' 3\").    Weight as of 8/4/23: 98.2 kg (216 lb 8 oz).    Physical Exam  Constitutional:       General: He is not in acute distress.     Appearance: Normal appearance. He is not ill-appearing or toxic-appearing.   Cardiovascular:      Rate and Rhythm: Normal rate and regular rhythm.      Pulses: Normal pulses.      Heart sounds: Normal heart sounds. No murmur heard.     No friction rub. No gallop.   Pulmonary:      Effort: Pulmonary effort is normal. No respiratory distress.      Breath sounds: Normal breath sounds. No wheezing or rales.   Neurological:      General: No focal deficit present.      Mental Status: He is alert and oriented to person, place, and time. Mental status is at baseline.      GCS: GCS eye subscore is 4. GCS verbal subscore is 5. GCS motor subscore is 6.      Cranial Nerves: Cranial nerves 2-12 are intact. No dysarthria or facial asymmetry.      Sensory: Sensation is intact. No sensory deficit.      Motor: Motor function is intact.      Coordination: Coordination is intact.      Gait: Gait is intact. Gait normal.   Psychiatric:         Attention and Perception: Attention and perception normal. He is attentive. He does not perceive auditory or visual hallucinations.         Mood and Affect: Mood normal. Mood is not anxious or elated. Affect is blunt. Affect is not labile, angry or inappropriate.         Speech: Speech normal. Speech is not rapid and pressured, delayed or slurred.         Behavior: Behavior normal. Behavior is not agitated, slowed, aggressive, withdrawn or hyperactive. Behavior is cooperative.         Thought " "Content: Thought content normal. Thought content is not paranoid or delusional. Thought content does not include homicidal or suicidal ideation.         Cognition and Memory: Cognition and memory normal.         Judgment: Judgment normal. Judgment is not impulsive or inappropriate.       Assessment and Plan     (F25.0) Schizoaffective disorder, bipolar type (H)  (primary encounter diagnosis)  Follow up on Quintin's schizoaffective disorder - currently on Olanzpaine therapy. Reviewed prior note - plan was to adjust his 20mg daily dose to a 10mg BID dose in efforts to mitigate fatigue - however it was inadvertently sent as a 20mg BID dose. On further review - the original script sent was as 10mg tablets - post-visit phone call with mother reports that he only ever took 1 tablet in the morning and 1 tablet at night. Therefore despite the error in the prescribed sig (20mg BID), patient was only taking 10mg BID.    Per mom - he has been somewhat more \"motivated\" to do things, but also a tad more anxious. Re-assuringly on exam he appears very similar to prior visits - no objective evidence of EPS/neuro side effects, no abdominal side effects. However, he manages his own medications and he is not totally adherent to them daily - and mother reports more restless behaviors whenever he is not on his medications (something that we have talked about prior as well). Given that he sometimes skips doses, or only takes 1 tablet - these could be present in light of under-dosing.     In terms of the medication - fortunately he has not experienced any side effects of any inadverent exposure to higher dose of olanzpine (though this seems less likely now given that the tablets were 10mg and he, at max, seemed to have taken 20mg total in a day and, at least 10mg in a day). Will cancel his current script and re-write the sig accurately reflect a 10mg BID dosing as originally intended and follow up in a month (will be setting up MyC for him " via his mother so we can have easier communication access). COMPASS report was submitted today regarding the sig.      In terms of the social concerns - our SW team will reach out to mom with contacts for SI  firms to help with case management of applying for SI; and help connect Quintin to  services as he requested help with this and is very interested in these services.       Medications Discontinued During This Encounter   Medication Reason    OLANZapine (ZYPREXA) 10 MG tablet Reorder (No AVS)    OLANZapine (ZYPREXA) 15 MG tablet     omeprazole (PRILOSEC) 40 MG DR capsule     famotidine (PEPCID) 20 MG tablet        Options for treatment and follow-up care were reviewed with the patient . Quintin Garduno  engaged in the decision making process and verbalized understanding of the options discussed and agreed with the final plan.    Sampson Chahal, DO

## 2024-02-20 ENCOUNTER — TELEPHONE (OUTPATIENT)
Dept: FAMILY MEDICINE | Facility: CLINIC | Age: 24
End: 2024-02-20
Payer: COMMERCIAL

## 2024-02-20 DIAGNOSIS — F25.0 SCHIZOAFFECTIVE DISORDER, BIPOLAR TYPE (H): ICD-10-CM

## 2024-02-20 DIAGNOSIS — T43.505D ANTIPSYCHOTICS, NEUROLEPTICS, AND MAJOR TRANQUILIZERS CAUSING ADVERSE EFFECT IN THERAPEUTIC USE, SUBSEQUENT ENCOUNTER: ICD-10-CM

## 2024-02-20 DIAGNOSIS — R63.5 WEIGHT GAIN: ICD-10-CM

## 2024-02-20 RX ORDER — METFORMIN HCL 500 MG
500 TABLET, EXTENDED RELEASE 24 HR ORAL
Qty: 90 TABLET | Refills: 1 | Status: SHIPPED | OUTPATIENT
Start: 2024-02-20

## 2024-02-20 RX ORDER — OLANZAPINE 10 MG/1
10 TABLET ORAL 2 TIMES DAILY
Qty: 90 TABLET | Refills: 3 | Status: SHIPPED | OUTPATIENT
Start: 2024-02-20 | End: 2024-02-20

## 2024-02-20 RX ORDER — OLANZAPINE 10 MG/1
10 TABLET ORAL 2 TIMES DAILY
Qty: 90 TABLET | Refills: 3 | Status: SHIPPED | OUTPATIENT
Start: 2024-02-20

## 2024-02-20 NOTE — Clinical Note
Heyy  I was wondering if you could help us get connected / had recs to help w/ getting connect to more intensive mental health services for moe?

## 2024-02-20 NOTE — TELEPHONE ENCOUNTER
[facility] Medicine Clinic phone call message- general phone call:    Reason for call:   Pt's mother called and stated that  gave them permission to call him when the pt is having an episode - Mother is stating that pt is having a lot of trouble right now and they are unable to contact PCP, they want to bring pt into a hospital for 30 day care - requesting a callback ASAP at [phone]    Return call needed: Yes    OK to leave a message on voice mail? Yes    Primary language: English      needed? No    Call taken on February 20, 2024 at 10:14 AM by Oscar Bowers

## 2024-02-20 NOTE — TELEPHONE ENCOUNTER
"Called and spoke with [facility], she said that Quintin is off and on with his medications, he is becoming very agitated when he cannot get his cannabis. He becomes aggressive with his sibling, is punching holes in the walls and breaking things.    [facility] is asking for programs (possibly inpatient) where patient could get counseling, and learn to take meds help get him stabilized?    I let her know that I would queue up meds that she she requested and send message to provider.    Request for medication refill:    Providers if patient needs an appointment and you are willing to give a one month supply please refill for one month and  send a letter/MyChart using \".SMILLIMITEDREFILL\" .smillimited and route chart to \"P SMI \" (Giving one month refill in non controlled medications is strongly recommended before denial)    If refill has been denied, meaning absolutely no refills without visit, please complete the smart phrase \".smirxrefuse\" and route it to the \"P SMI MED REFILLS\"  pool to inform the patient and the pharmacy.    Porsha Choudhury RN      "

## 2024-02-20 NOTE — TELEPHONE ENCOUNTER
Called mother to follow up on message, left message to call back.    Sending to Dr. Chahal also    Porsha Choudhury RN

## 2024-02-23 ENCOUNTER — TELEPHONE (OUTPATIENT)
Dept: BEHAVIORAL HEALTH | Facility: CLINIC | Age: 24
End: 2024-02-23
Payer: COMMERCIAL

## 2024-02-28 ENCOUNTER — TELEPHONE (OUTPATIENT)
Dept: FAMILY MEDICINE | Facility: CLINIC | Age: 24
End: 2024-02-28
Payer: COMMERCIAL

## 2024-02-28 NOTE — TELEPHONE ENCOUNTER
SW CC attempted telephone outreach to patient's mother to relay information that central scheduling attempted to reach out to assist getting patient scheduled.     No answer, therefore left voicemail and direct callback number.     TAMIKO Curry  Behavioral Health Home- Social Work Care Coordinator

## 2024-07-30 ENCOUNTER — TELEPHONE (OUTPATIENT)
Dept: FAMILY MEDICINE | Facility: CLINIC | Age: 24
End: 2024-07-30
Payer: COMMERCIAL

## 2024-07-30 NOTE — TELEPHONE ENCOUNTER
[facility] Medicine Clinic phone call message- general phone call:    Reason for call: Patient's mom states that there was a psychiatrist here that patient saw/spoke too but I do not see anything in patient's chart. Please reach out to mom @[phone].    Return call needed: Yes    OK to leave a message on voice mail? Yes    Primary language: English      needed? No    Call taken on July 30, 2024 at 4:46 PM by Carol Black

## 2024-07-31 NOTE — TELEPHONE ENCOUNTER
CATHERINE CC attempted telephone call back to patient's mother who called. ([phone]). No answer therefore CATHERINE did leave a voicemail with direct call back.     TAMIKO Curry  Behavioral Health Home- Social Work Care Coordinator

## 2024-11-12 ENCOUNTER — TELEPHONE (OUTPATIENT)
Dept: FAMILY MEDICINE | Facility: CLINIC | Age: 24
End: 2024-11-12
Payer: COMMERCIAL

## 2024-11-12 NOTE — TELEPHONE ENCOUNTER
Patient has not been seen since 10/31/2023, there is a Consent to Communicate in the chart.    Porsha Choudhury RN

## 2024-11-12 NOTE — TELEPHONE ENCOUNTER
[facility] phone call message- general phone call:    Reason for call: Patient's mom walked into clinic requesting to speak to patient's doctor. Pt use to see  and patient's mom is requesting for  to review 's notes especially from encounter on 02/20 as that is what she wants to discuss. She states that he is not wanting to take his medications and that she wants to put him in a inpatient for 30 days.    Return call needed: Yes, can call mom @ [phone] or call [phone].    OK to leave a message on voice mail? Yes    Primary language: English      needed? No    Call taken on November 12, 2024 at 4:45 PM by Carol Black

## 2024-11-13 NOTE — TELEPHONE ENCOUNTER
CATHERINE CC Called patient's mother back. Scheduled appt with Dr. Tai 11/15 at 3:40PM. WESLY culver by RN r/t mental health follow up and med management.     [facility] SW available for additional support as well if needed. (Pt not enrolled in [facility] but SW somewhat familiar with patient)     TAMIKO Curry  Social Work Care Coordinator

## 2025-01-29 ENCOUNTER — TELEPHONE (OUTPATIENT)
Dept: FAMILY MEDICINE | Facility: CLINIC | Age: 25
End: 2025-01-29
Payer: COMMERCIAL

## 2025-01-29 NOTE — TELEPHONE ENCOUNTER
[facility] phone call message- general phone call:    Reason for call: Pts mother states she tried to get pts healthcare records 2 months ago for landlord and leasing purposes (to move to a more spacious apartment due to her sons healthcare needs). Pt also expresses how difficult it is for him to come into appointments and requests to speak to a  about getting her son set up for home health care visits (again?). Mother states she understands that her son has not been seen in a while due to their difficulty or inability to make it into visits here at [facility]. Mother would like to discuss options for healthcare visits with a .     I also had mother fill out an KAYLA again. She is requesting medical records are printed out and available for pickup here at [facility].     Consent to Communicate for Mother on file    Return call needed: Yes    OK to leave a message on voice mail? Yes    Primary language: English      needed? No    Call taken on January 29, 2025 at 10:03 AM by Thom Faustin

## 2025-01-29 NOTE — TELEPHONE ENCOUNTER
CATHERINE CC connected with patient's mom via phone. Explained to her that previous home visit with Dr. Chahal is not something we regularly do through our clinic. Explained resident requirement to complete prior to graduation. Mom understood. She states she would like to get appt scheduled for patient for follow up.     CATHERINE assisted with getting patient scheduled next month 2/21 with assigned PCP Dr. Nix.     Patient's mother stated she would like records prior to that date as she needs to provide this to their landlord to assist with finding alternate housing. Let her know  team did send this to medical records as well.     Hyun Llamas, [facility]  Social Work Care Coordinator

## 2025-06-10 ENCOUNTER — TELEPHONE (OUTPATIENT)
Dept: FAMILY MEDICINE | Facility: CLINIC | Age: 25
End: 2025-06-10
Payer: COMMERCIAL

## 2025-06-10 NOTE — TELEPHONE ENCOUNTER
Telephone Message     6/10/2025  7:54 AM    Call returned by Dionicio Rodriguez DO    Patient: Quintin Garduno   Phone number-  [phone] (home)       Received message through after-hours physician line from patient's mother Aury stating patient had been out of their medications for the last 4 months. Ambulance reportedly on the way and requesting reevaluation.    Attempted to reach patient at listed phone number without answer. I left a message on answering machine instructing patient/family to call back if assistance is still needed. Will reach out to patient's care team to assist with scheduling a follow up appointment.    Kee Rodriguez DO